# Patient Record
Sex: FEMALE | Race: WHITE
[De-identification: names, ages, dates, MRNs, and addresses within clinical notes are randomized per-mention and may not be internally consistent; named-entity substitution may affect disease eponyms.]

---

## 2017-01-04 ENCOUNTER — HOSPITAL ENCOUNTER (OUTPATIENT)
Dept: HOSPITAL 53 - M SFHCLERA | Age: 57
End: 2017-01-04
Attending: NURSE PRACTITIONER
Payer: COMMERCIAL

## 2017-01-04 DIAGNOSIS — J06.9: Primary | ICD-10-CM

## 2017-11-25 ENCOUNTER — HOSPITAL ENCOUNTER (OUTPATIENT)
Dept: HOSPITAL 53 - M LRY | Age: 57
End: 2017-11-25
Attending: NURSE PRACTITIONER
Payer: COMMERCIAL

## 2017-11-25 DIAGNOSIS — M25.562: Primary | ICD-10-CM

## 2017-11-25 NOTE — REP
Clinical:  Pain

 

Technique:  AP, lateral, bilateral oblique and sunrise views.

 

Findings:  The osseous structures and joint spaces are intact and normal for age.

There is no evidence for acute fracture or dislocation.  Lateral view suggests a

small suprapatellar effusion.  Sunrise view demonstrates mild patellofemoral

joint space narrowing.  Surrounding soft tissues are unremarkable.  No

subcutaneous emphysema or radiodense foreign body.

 

Impression:

Possible small suprapatellar effusion and minimal patellofemoral joint space

narrowing.

 

 

Signed by

Michael Perez MD 11/25/2017 01:50 P

## 2018-10-29 ENCOUNTER — HOSPITAL ENCOUNTER (OUTPATIENT)
Dept: HOSPITAL 53 - M LRY | Age: 58
End: 2018-10-29
Attending: NURSE PRACTITIONER
Payer: COMMERCIAL

## 2018-10-29 DIAGNOSIS — K44.9: ICD-10-CM

## 2018-10-29 DIAGNOSIS — R05: Primary | ICD-10-CM

## 2018-10-29 PROCEDURE — 71046 X-RAY EXAM CHEST 2 VIEWS: CPT

## 2022-10-17 ENCOUNTER — HOSPITAL ENCOUNTER (OUTPATIENT)
Dept: HOSPITAL 53 - M SLEEP | Age: 62
End: 2022-10-17
Attending: STUDENT IN AN ORGANIZED HEALTH CARE EDUCATION/TRAINING PROGRAM
Payer: COMMERCIAL

## 2022-10-17 DIAGNOSIS — G47.33: Primary | ICD-10-CM

## 2022-10-27 ENCOUNTER — HOSPITAL ENCOUNTER (OUTPATIENT)
Dept: HOSPITAL 53 - M CARPUL | Age: 62
End: 2022-10-27
Attending: STUDENT IN AN ORGANIZED HEALTH CARE EDUCATION/TRAINING PROGRAM
Payer: COMMERCIAL

## 2022-10-27 DIAGNOSIS — K76.89: ICD-10-CM

## 2022-10-27 DIAGNOSIS — R93.1: ICD-10-CM

## 2022-10-27 DIAGNOSIS — R06.00: Primary | ICD-10-CM

## 2022-10-27 DIAGNOSIS — I34.0: ICD-10-CM

## 2023-06-21 ENCOUNTER — HOSPITAL ENCOUNTER (OUTPATIENT)
Dept: HOSPITAL 53 - M RAD | Age: 63
End: 2023-06-21
Attending: STUDENT IN AN ORGANIZED HEALTH CARE EDUCATION/TRAINING PROGRAM
Payer: COMMERCIAL

## 2023-06-21 DIAGNOSIS — K76.0: ICD-10-CM

## 2023-06-21 DIAGNOSIS — R91.8: Primary | ICD-10-CM

## 2023-06-21 PROCEDURE — 71260 CT THORAX DX C+: CPT
